# Patient Record
Sex: MALE | Race: BLACK OR AFRICAN AMERICAN | NOT HISPANIC OR LATINO
[De-identification: names, ages, dates, MRNs, and addresses within clinical notes are randomized per-mention and may not be internally consistent; named-entity substitution may affect disease eponyms.]

---

## 2022-03-24 PROBLEM — Z00.00 ENCOUNTER FOR PREVENTIVE HEALTH EXAMINATION: Status: ACTIVE | Noted: 2022-03-24

## 2022-04-26 ENCOUNTER — APPOINTMENT (OUTPATIENT)
Dept: PHYSICAL MEDICINE AND REHAB | Facility: CLINIC | Age: 22
End: 2022-04-26
Payer: MEDICAID

## 2022-04-26 VITALS
OXYGEN SATURATION: 98 % | BODY MASS INDEX: 23.1 KG/M2 | HEIGHT: 74 IN | WEIGHT: 180 LBS | HEART RATE: 77 BPM | SYSTOLIC BLOOD PRESSURE: 122 MMHG | DIASTOLIC BLOOD PRESSURE: 69 MMHG

## 2022-04-26 PROCEDURE — 99203 OFFICE O/P NEW LOW 30 MIN: CPT

## 2022-04-26 RX ORDER — DIVALPROEX SODIUM 500 1/1
500 TABLET, EXTENDED RELEASE ORAL
Refills: 0 | Status: ACTIVE | COMMUNITY
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
## 2022-04-30 NOTE — END OF VISIT
[FreeTextEntry3] : pt. seen and assessed with fellow-- agree with H&P and plan of care as above.  [] : Fellow
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
## 2022-04-30 NOTE — PHYSICAL EXAM
[FreeTextEntry1] : Constitutional: Alert, awake, no acute distress\par HEENT: EOMI, NC/AT, neck supple\par Cardiovascular: All extremities warm and well perfused\par Pulmonary: No respiratory distress, normal chest wall expansion\par Gastrointestinal: No abdominal distention\par \par Neuro:\par AAOx3, MOCA 20/30 with impairments in visuospatial/executive function, calculations, delayed recall, abstract thinking, and precise date.\par Recall 3/5 spontaneously. \par Attention--impaired \par \par CN: intact no nystagmus, visual fields intact, PERRLA, EOMI, no facial weakness or sensory deficit, shoulder shrug intact, tongue midline\par Motor 5/5 bilat UE and LE\par Sens intact bilat UE and LE\par Coordination intact--FNF and HTS intact\par Gait: Reciprocal gait without loss of balance\par

## 2022-07-19 ENCOUNTER — NON-APPOINTMENT (OUTPATIENT)
Age: 22
End: 2022-07-19

## 2022-07-19 ENCOUNTER — APPOINTMENT (OUTPATIENT)
Dept: PHYSICAL MEDICINE AND REHAB | Facility: CLINIC | Age: 22
End: 2022-07-19

## 2022-07-19 VITALS
SYSTOLIC BLOOD PRESSURE: 111 MMHG | BODY MASS INDEX: 26.95 KG/M2 | WEIGHT: 210 LBS | OXYGEN SATURATION: 98 % | HEART RATE: 68 BPM | HEIGHT: 74 IN | DIASTOLIC BLOOD PRESSURE: 70 MMHG | TEMPERATURE: 97.9 F

## 2022-07-19 PROCEDURE — 99213 OFFICE O/P EST LOW 20 MIN: CPT | Mod: GC

## 2022-07-21 NOTE — SOCIAL HISTORY
[Private Home] : in a private home [No Device Needed] : Patient doesn't use a device for ambulation [Stairs to enter?] : no stairs to enter

## 2022-07-21 NOTE — END OF VISIT
[] : Fellow [FreeTextEntry3] : Patient seen and assessed.  Agree with History, PE and plan of care as documented above.

## 2022-07-21 NOTE — PHYSICAL EXAM
[5] : S1 ankle flexors 5/5 [No Visual Abnormalities] : no visible abnormalities [Normal] : Deep tendon reflexes were 2+ and symmetric, the sensory exam was normal to light touch and pinprick and no focal deficits [FreeTextEntry1] : AAOx3\par MOCA score of 23.5/30. (last visit 20/30)\par Patient with visuospatial/executive deficits, scored 1/2 on trails B test, unable to complete clock drawing- difficulty with hand placement.\par Attention:  He is able to repeat numbers in the correct order but has difficulty with backward repetition. Also difficulty with serial 7's and scored a 2/3.\par Memory:  Delayed recall is 3/5. No issues with naming objects. \par Overall patient continues to exhibit difficulty with executive function. \par \par CN: intact no nystagmus, visual fields intact, PERRLA, EOMI, no facial weakness or sensory deficit, shoulder shrug intact, tongue midline\par  Motor 5/5 bilat UE and LE\par  Sens intact bilat UE and LE\par  Coordination intact--FNF and HTS intact \par Gait: Reciprocal gait without loss of balance
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
## 2022-10-18 ENCOUNTER — APPOINTMENT (OUTPATIENT)
Dept: PHYSICAL MEDICINE AND REHAB | Facility: CLINIC | Age: 22
End: 2022-10-18

## 2022-10-18 VITALS
SYSTOLIC BLOOD PRESSURE: 110 MMHG | DIASTOLIC BLOOD PRESSURE: 73 MMHG | TEMPERATURE: 98.1 F | OXYGEN SATURATION: 98 % | HEIGHT: 74 IN | HEART RATE: 74 BPM | BODY MASS INDEX: 26.95 KG/M2 | WEIGHT: 210 LBS

## 2022-10-18 PROCEDURE — 99213 OFFICE O/P EST LOW 20 MIN: CPT
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
## 2022-10-18 NOTE — END OF VISIT
[] : Fellow [FreeTextEntry3] : pt. seen with fellow.  agree with documentation as above.  amended where appropriate.

## 2022-10-18 NOTE — PHYSICAL EXAM
[FreeTextEntry1] : AAOx3\par MOCA  29/30. Able to say months backwards, serial 7's and more complex calculations, delayed recall intact. \par \par CN: intact no nystagmus, visual fields intact, PERRLA, EOMI, no facial weakness or sensory deficit, shoulder shrug intact, tongue midline\par Motor 5/5 bilat UE and LE\par Sens intact bilat UE and LE\par Coordination intact--FNF and HTS intact\par Gait: Reciprocal gait without loss of balance. \par \par

## 2022-10-18 NOTE — REVIEW OF SYSTEMS
[Negative] : Neurological [Fever] : no fever [Chills] : no chills [Fatigue] : no fatigue [Chest Pain] : no chest pain [Palpitations] : no palpitations [Shortness Of Breath] : no shortness of breath [Wheezing] : no wheezing [Abdominal Pain] : no abdominal pain [Nausea] : no nausea [Constipation] : no constipation [Joint Pain] : no joint pain [Joint Stiffness] : no joint stiffness [Muscle Pain] : no muscle pain [Headache] : no headaches [Dizziness] : no dizziness
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
## 2023-01-31 ENCOUNTER — NON-APPOINTMENT (OUTPATIENT)
Age: 23
End: 2023-01-31

## 2023-01-31 ENCOUNTER — APPOINTMENT (OUTPATIENT)
Dept: PHYSICAL MEDICINE AND REHAB | Facility: CLINIC | Age: 23
End: 2023-01-31
Payer: MEDICAID

## 2023-01-31 VITALS
OXYGEN SATURATION: 97 % | HEIGHT: 74 IN | TEMPERATURE: 97.3 F | BODY MASS INDEX: 27.72 KG/M2 | WEIGHT: 216 LBS | HEART RATE: 79 BPM | DIASTOLIC BLOOD PRESSURE: 70 MMHG | SYSTOLIC BLOOD PRESSURE: 105 MMHG

## 2023-01-31 DIAGNOSIS — R56.9 UNSPECIFIED CONVULSIONS: ICD-10-CM

## 2023-01-31 PROCEDURE — 99212 OFFICE O/P EST SF 10 MIN: CPT | Mod: GC
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
## 2023-03-02 NOTE — PHYSICAL EXAM
[FreeTextEntry1] : AAOx3\par Able to say months backwards, serial 7's and more complex calculations, delayed recall 2/3, 3/3 with multiple choice cues  \par \par CN: intact no nystagmus, visual fields intact, PERRLA, EOMI, no facial weakness or sensory deficit, shoulder shrug intact, tongue midline\par Motor 5/5 bilat UE and LE\par Sens intact bilat UE and LE\par Coordination intact--FNF and HTS intact\par Gait: Reciprocal gait without loss of balance

## 2023-03-02 NOTE — END OF VISIT
[] : Fellow [FreeTextEntry3] : Seen with fellow, Dr. Alma Delia Francis.  Agree with documentation as above
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
## 2023-07-19 ENCOUNTER — APPOINTMENT (OUTPATIENT)
Dept: PHYSICAL MEDICINE AND REHAB | Facility: CLINIC | Age: 23
End: 2023-07-19
Payer: MEDICAID

## 2023-07-19 VITALS
SYSTOLIC BLOOD PRESSURE: 107 MMHG | WEIGHT: 204 LBS | DIASTOLIC BLOOD PRESSURE: 65 MMHG | BODY MASS INDEX: 26.18 KG/M2 | HEIGHT: 74 IN | HEART RATE: 65 BPM | OXYGEN SATURATION: 97 % | TEMPERATURE: 97.6 F

## 2023-07-19 DIAGNOSIS — R41.89 OTHER SYMPTOMS AND SIGNS INVOLVING COGNITIVE FUNCTIONS AND AWARENESS: ICD-10-CM

## 2023-07-19 DIAGNOSIS — R41.3 OTHER AMNESIA: ICD-10-CM

## 2023-07-19 DIAGNOSIS — R41.842 VISUOSPATIAL DEFICIT: ICD-10-CM

## 2023-07-19 DIAGNOSIS — S06.9XAA UNSPECIFIED INTRACRANIAL INJURY WITH LOSS OF CONSCIOUSNESS STATUS UNKNOWN, INITIAL ENCOUNTER: ICD-10-CM

## 2023-07-19 PROCEDURE — 99214 OFFICE O/P EST MOD 30 MIN: CPT

## 2023-07-25 PROBLEM — R41.3 SHORT-TERM MEMORY LOSS: Status: ACTIVE | Noted: 2022-04-26

## 2023-07-25 PROBLEM — S06.9XAA TRAUMATIC BRAIN INJURY: Status: ACTIVE | Noted: 2022-04-26

## 2023-07-25 PROBLEM — R41.842: Status: ACTIVE | Noted: 2023-07-19

## 2023-07-25 PROBLEM — R41.89 COGNITIVE DEFICITS: Status: ACTIVE | Noted: 2022-04-26
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
## 2023-07-25 NOTE — PHYSICAL EXAM
[FreeTextEntry1] : General: no acute distress, pleasant  \par CV: well perfused \par Lungs: breathing comf on RA\par Ext: no periph edema \par Neuro:\par         Communication: +fluency, +comprehension, +repetition\par         Mental status: MOCA completed (26.5), difficulty with hand placement on clock drawing, visual spatial issues drawing cube, difficulty with serial 7s, A&Ox to person/place/ didn’t know date, recall 4/5 spontaneously & 5/5 with multiple choice cues \par         CN V-VII intact \par         Strength: 5/5 Right and Left UE/LE \par         Sensation: intact \par         Coordination: Right/left finger-to-nose nml\par         Gait: nml

## 2023-07-25 NOTE — END OF VISIT
[FreeTextEntry3] : Patient seen and examined with fellow.  Agree with documentation as above. [] : Fellow [Time Spent: ___ minutes] : I have spent [unfilled] minutes of time on the encounter.

## 2023-11-08 ENCOUNTER — APPOINTMENT (OUTPATIENT)
Dept: PHYSICAL MEDICINE AND REHAB | Facility: CLINIC | Age: 23
End: 2023-11-08

## 2023-12-27 ENCOUNTER — EMERGENCY (EMERGENCY)
Facility: HOSPITAL | Age: 23
LOS: 0 days | Discharge: HOME | End: 2023-12-28
Attending: STUDENT IN AN ORGANIZED HEALTH CARE EDUCATION/TRAINING PROGRAM
Payer: COMMERCIAL

## 2023-12-27 VITALS
WEIGHT: 160.06 LBS | RESPIRATION RATE: 18 BRPM | SYSTOLIC BLOOD PRESSURE: 140 MMHG | OXYGEN SATURATION: 100 % | DIASTOLIC BLOOD PRESSURE: 90 MMHG | TEMPERATURE: 98 F | HEIGHT: 75 IN | HEART RATE: 117 BPM

## 2023-12-27 DIAGNOSIS — U07.1 COVID-19: ICD-10-CM

## 2023-12-27 DIAGNOSIS — G40.909 EPILEPSY, UNSPECIFIED, NOT INTRACTABLE, WITHOUT STATUS EPILEPTICUS: ICD-10-CM

## 2023-12-27 DIAGNOSIS — I30.9 ACUTE PERICARDITIS, UNSPECIFIED: ICD-10-CM

## 2023-12-27 LAB
ANION GAP SERPL CALC-SCNC: 4 MMOL/L — LOW (ref 5–17)
ANION GAP SERPL CALC-SCNC: 4 MMOL/L — LOW (ref 5–17)
BASOPHILS # BLD AUTO: 0.02 K/UL — SIGNIFICANT CHANGE UP (ref 0–0.2)
BASOPHILS # BLD AUTO: 0.02 K/UL — SIGNIFICANT CHANGE UP (ref 0–0.2)
BASOPHILS NFR BLD AUTO: 0.2 % — SIGNIFICANT CHANGE UP (ref 0–2)
BASOPHILS NFR BLD AUTO: 0.2 % — SIGNIFICANT CHANGE UP (ref 0–2)
BUN SERPL-MCNC: 16 MG/DL — SIGNIFICANT CHANGE UP (ref 7–23)
BUN SERPL-MCNC: 16 MG/DL — SIGNIFICANT CHANGE UP (ref 7–23)
CALCIUM SERPL-MCNC: 9.1 MG/DL — SIGNIFICANT CHANGE UP (ref 8.5–10.1)
CALCIUM SERPL-MCNC: 9.1 MG/DL — SIGNIFICANT CHANGE UP (ref 8.5–10.1)
CHLORIDE SERPL-SCNC: 108 MMOL/L — SIGNIFICANT CHANGE UP (ref 96–108)
CHLORIDE SERPL-SCNC: 108 MMOL/L — SIGNIFICANT CHANGE UP (ref 96–108)
CK SERPL-CCNC: 125 U/L — SIGNIFICANT CHANGE UP (ref 26–308)
CK SERPL-CCNC: 125 U/L — SIGNIFICANT CHANGE UP (ref 26–308)
CO2 SERPL-SCNC: 30 MMOL/L — SIGNIFICANT CHANGE UP (ref 22–31)
CO2 SERPL-SCNC: 30 MMOL/L — SIGNIFICANT CHANGE UP (ref 22–31)
CREAT SERPL-MCNC: 0.98 MG/DL — SIGNIFICANT CHANGE UP (ref 0.5–1.3)
CREAT SERPL-MCNC: 0.98 MG/DL — SIGNIFICANT CHANGE UP (ref 0.5–1.3)
EGFR: 110 ML/MIN/1.73M2 — SIGNIFICANT CHANGE UP
EGFR: 110 ML/MIN/1.73M2 — SIGNIFICANT CHANGE UP
EOSINOPHIL # BLD AUTO: 0.04 K/UL — SIGNIFICANT CHANGE UP (ref 0–0.5)
EOSINOPHIL # BLD AUTO: 0.04 K/UL — SIGNIFICANT CHANGE UP (ref 0–0.5)
EOSINOPHIL NFR BLD AUTO: 0.4 % — SIGNIFICANT CHANGE UP (ref 0–6)
EOSINOPHIL NFR BLD AUTO: 0.4 % — SIGNIFICANT CHANGE UP (ref 0–6)
GLUCOSE SERPL-MCNC: 118 MG/DL — HIGH (ref 70–99)
GLUCOSE SERPL-MCNC: 118 MG/DL — HIGH (ref 70–99)
HCT VFR BLD CALC: 43.8 % — SIGNIFICANT CHANGE UP (ref 39–50)
HCT VFR BLD CALC: 43.8 % — SIGNIFICANT CHANGE UP (ref 39–50)
HGB BLD-MCNC: 14.7 G/DL — SIGNIFICANT CHANGE UP (ref 13–17)
HGB BLD-MCNC: 14.7 G/DL — SIGNIFICANT CHANGE UP (ref 13–17)
IMM GRANULOCYTES NFR BLD AUTO: 0.5 % — SIGNIFICANT CHANGE UP (ref 0–0.9)
IMM GRANULOCYTES NFR BLD AUTO: 0.5 % — SIGNIFICANT CHANGE UP (ref 0–0.9)
LYMPHOCYTES # BLD AUTO: 2.16 K/UL — SIGNIFICANT CHANGE UP (ref 1–3.3)
LYMPHOCYTES # BLD AUTO: 2.16 K/UL — SIGNIFICANT CHANGE UP (ref 1–3.3)
LYMPHOCYTES # BLD AUTO: 21.5 % — SIGNIFICANT CHANGE UP (ref 13–44)
LYMPHOCYTES # BLD AUTO: 21.5 % — SIGNIFICANT CHANGE UP (ref 13–44)
MCHC RBC-ENTMCNC: 30.9 PG — SIGNIFICANT CHANGE UP (ref 27–34)
MCHC RBC-ENTMCNC: 30.9 PG — SIGNIFICANT CHANGE UP (ref 27–34)
MCHC RBC-ENTMCNC: 33.6 G/DL — SIGNIFICANT CHANGE UP (ref 32–36)
MCHC RBC-ENTMCNC: 33.6 G/DL — SIGNIFICANT CHANGE UP (ref 32–36)
MCV RBC AUTO: 92 FL — SIGNIFICANT CHANGE UP (ref 80–100)
MCV RBC AUTO: 92 FL — SIGNIFICANT CHANGE UP (ref 80–100)
MONOCYTES # BLD AUTO: 0.82 K/UL — SIGNIFICANT CHANGE UP (ref 0–0.9)
MONOCYTES # BLD AUTO: 0.82 K/UL — SIGNIFICANT CHANGE UP (ref 0–0.9)
MONOCYTES NFR BLD AUTO: 8.2 % — SIGNIFICANT CHANGE UP (ref 2–14)
MONOCYTES NFR BLD AUTO: 8.2 % — SIGNIFICANT CHANGE UP (ref 2–14)
NEUTROPHILS # BLD AUTO: 6.95 K/UL — SIGNIFICANT CHANGE UP (ref 1.8–7.4)
NEUTROPHILS # BLD AUTO: 6.95 K/UL — SIGNIFICANT CHANGE UP (ref 1.8–7.4)
NEUTROPHILS NFR BLD AUTO: 69.2 % — SIGNIFICANT CHANGE UP (ref 43–77)
NEUTROPHILS NFR BLD AUTO: 69.2 % — SIGNIFICANT CHANGE UP (ref 43–77)
NRBC # BLD: 0 /100 WBCS — SIGNIFICANT CHANGE UP (ref 0–0)
NRBC # BLD: 0 /100 WBCS — SIGNIFICANT CHANGE UP (ref 0–0)
PLATELET # BLD AUTO: 206 K/UL — SIGNIFICANT CHANGE UP (ref 150–400)
PLATELET # BLD AUTO: 206 K/UL — SIGNIFICANT CHANGE UP (ref 150–400)
POTASSIUM SERPL-MCNC: 4.1 MMOL/L — SIGNIFICANT CHANGE UP (ref 3.5–5.3)
POTASSIUM SERPL-MCNC: 4.1 MMOL/L — SIGNIFICANT CHANGE UP (ref 3.5–5.3)
POTASSIUM SERPL-SCNC: 4.1 MMOL/L — SIGNIFICANT CHANGE UP (ref 3.5–5.3)
POTASSIUM SERPL-SCNC: 4.1 MMOL/L — SIGNIFICANT CHANGE UP (ref 3.5–5.3)
RBC # BLD: 4.76 M/UL — SIGNIFICANT CHANGE UP (ref 4.2–5.8)
RBC # BLD: 4.76 M/UL — SIGNIFICANT CHANGE UP (ref 4.2–5.8)
RBC # FLD: 12.7 % — SIGNIFICANT CHANGE UP (ref 10.3–14.5)
RBC # FLD: 12.7 % — SIGNIFICANT CHANGE UP (ref 10.3–14.5)
SODIUM SERPL-SCNC: 142 MMOL/L — SIGNIFICANT CHANGE UP (ref 135–145)
SODIUM SERPL-SCNC: 142 MMOL/L — SIGNIFICANT CHANGE UP (ref 135–145)
WBC # BLD: 10.04 K/UL — SIGNIFICANT CHANGE UP (ref 3.8–10.5)
WBC # BLD: 10.04 K/UL — SIGNIFICANT CHANGE UP (ref 3.8–10.5)
WBC # FLD AUTO: 10.04 K/UL — SIGNIFICANT CHANGE UP (ref 3.8–10.5)
WBC # FLD AUTO: 10.04 K/UL — SIGNIFICANT CHANGE UP (ref 3.8–10.5)

## 2023-12-27 PROCEDURE — 99285 EMERGENCY DEPT VISIT HI MDM: CPT

## 2023-12-27 RX ORDER — LEVETIRACETAM 250 MG/1
500 TABLET, FILM COATED ORAL ONCE
Refills: 0 | Status: COMPLETED | OUTPATIENT
Start: 2023-12-27 | End: 2023-12-27

## 2023-12-27 RX ORDER — VALPROIC ACID (AS SODIUM SALT) 250 MG/5ML
1000 SOLUTION, ORAL ORAL ONCE
Refills: 0 | Status: COMPLETED | OUTPATIENT
Start: 2023-12-27 | End: 2023-12-27

## 2023-12-27 RX ORDER — SODIUM CHLORIDE 9 MG/ML
1000 INJECTION INTRAMUSCULAR; INTRAVENOUS; SUBCUTANEOUS ONCE
Refills: 0 | Status: COMPLETED | OUTPATIENT
Start: 2023-12-27 | End: 2023-12-27

## 2023-12-27 NOTE — ED ADULT NURSE NOTE - NSFALLUNIVINTERV_ED_ALL_ED
Bed/Stretcher in lowest position, wheels locked, appropriate side rails in place/Call bell, personal items and telephone in reach/Instruct patient to call for assistance before getting out of bed/chair/stretcher/Non-slip footwear applied when patient is off stretcher/[place] to call system/Physically safe environment - no spills, clutter or unnecessary equipment/Purposeful proactive rounding/Room/bathroom lighting operational, light cord in reach Bed/Stretcher in lowest position, wheels locked, appropriate side rails in place/Call bell, personal items and telephone in reach/Instruct patient to call for assistance before getting out of bed/chair/stretcher/Non-slip footwear applied when patient is off stretcher/[place] to call system/Physically safe environment - no spills, clutter or unnecessary equipment/Purposeful proactive rounding/Room/bathroom lighting operational, light cord in reach
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
## 2023-12-27 NOTE — ED ADULT TRIAGE NOTE - BEFAST SCREENING
Negative (1) Drift; limb holds 90 (or 45) degrees, but drifts down before full 10 secs; does not hit bed or other support

## 2023-12-27 NOTE — ED ADULT NURSE NOTE - NS PRO PASSIVE SMOKE EXP
CV: HR initially 60-63 BPM; increased throughout shift to 80-85. Occasional PAC and PVCs. 12 lead with normal QTC at 1500. Vasopressors titrated to keep MAP> 65. Current gtts include levophed (0.65mcg/kg/min), dopamine (20mcg/kg/min), epi (1.3 mck/kg/min), vasopressin (2.4u/hr), and angiotensin II (40ng/kg/min). Heart sounds are muffled. Pulses are weak in all four extremities. Feet and hands cool and dusky. T max 99.4 axillary; MD team aware.     RESP: Ventilator changes made throughout day to manage acidosis. Most recent ABG 7.39/27/146/16. Current settings , RR 26, PEEP 5, FIO2 75%; will draw ABG at 1900 to assess changes made at approximately 1750. Lung sounds are clear with decreased bases. Minimal secretions from ETT.      NEURO: on versed and fentanyl for sedation. Period of approximately one hour with muscular vesiculation, twitching, and dilated pupils. Versed bolus given, narcan gtt stopped, and fentanyl gtt restarted. Continuous EEG in process. Currently, patient with a RASS of -3, but will rouse with painful stimulation. During these periods, patient will localize to pain and make purposeful attempts to pull at ett and lines. Followed simple commands x1. Head CT performed.    RENAL/: Oliguric. Currently net positive 7.7L. Napoles catheter in place to monitor strict I/O.     GI: Abdomen increasingly distended throughout shift. No stool or flatus. Bowel sounds hypoactive to absent. OGT found to be coiled back into esophagus; removed and readvanced. Placed to LIS. Abdominal CT performed, awaiting results.     SKIN: Multiple bruises and abrasions on lower extremities. Possible DTI on right buttock. WOC consult placed. Foam dressing to sacrum.           Problem: Mood Alteration Comorbidity  Goal: Mood Alteration Comorbidity  Description  Patient comorbidity will be monitored for signs and symptoms of Mood Alteration condition.  Problems will be absent, minimized or managed by discharge/transition of  care.  Outcome: No Change      Yes...

## 2023-12-27 NOTE — ED ADULT NURSE NOTE - CHIEF COMPLAINT QUOTE
biba s/p witnessed seizures today hx seizures takes keppra and valproic acid states compliant with rx meds was seated in car no fall /injury occurred alert and responsive at present , post ictal , denies pain or dizziness [id number] per ems biba s/p witnessed seizures today hx seizures takes keppra and valproic acid states compliant with rx meds was seated in car no fall /injury occurred alert and responsive at present , post ictal , denies pain or dizziness hj=035 per ems
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
## 2023-12-28 VITALS
DIASTOLIC BLOOD PRESSURE: 74 MMHG | OXYGEN SATURATION: 99 % | HEART RATE: 69 BPM | TEMPERATURE: 98 F | SYSTOLIC BLOOD PRESSURE: 129 MMHG | RESPIRATION RATE: 20 BRPM

## 2023-12-28 LAB
RAPID RVP RESULT: DETECTED
RAPID RVP RESULT: DETECTED
SARS-COV-2 RNA SPEC QL NAA+PROBE: DETECTED
SARS-COV-2 RNA SPEC QL NAA+PROBE: DETECTED

## 2023-12-28 PROCEDURE — 71045 X-RAY EXAM CHEST 1 VIEW: CPT | Mod: 26

## 2023-12-28 PROCEDURE — 93010 ELECTROCARDIOGRAM REPORT: CPT

## 2023-12-28 RX ORDER — IBUPROFEN 200 MG
1 TABLET ORAL
Qty: 21 | Refills: 0
Start: 2023-12-28 | End: 2024-01-03

## 2023-12-28 RX ORDER — LEVETIRACETAM 250 MG/1
1000 TABLET, FILM COATED ORAL ONCE
Refills: 0 | Status: COMPLETED | OUTPATIENT
Start: 2023-12-28 | End: 2023-12-28

## 2023-12-28 RX ADMIN — LEVETIRACETAM 400 MILLIGRAM(S): 250 TABLET, FILM COATED ORAL at 00:43

## 2023-12-28 RX ADMIN — Medication 1000 MILLIGRAM(S): at 00:18

## 2023-12-28 RX ADMIN — SODIUM CHLORIDE 1000 MILLILITER(S): 9 INJECTION INTRAMUSCULAR; INTRAVENOUS; SUBCUTANEOUS at 00:18

## 2023-12-28 RX ADMIN — LEVETIRACETAM 500 MILLIGRAM(S): 250 TABLET, FILM COATED ORAL at 00:18

## 2023-12-28 NOTE — ED PROVIDER NOTE - PROVIDER TOKENS
PROVIDER:[TOKEN:[[id number]],FOLLOWUP:[7-10 Days]] PROVIDER:[TOKEN:[[id number]],FOLLOWUP:[7-10 Days]] PROVIDER:[TOKEN:[[id number]],FOLLOWUP:[7-10 Days]],PROVIDER:[TOKEN:[[id number]],FOLLOWUP:[7-10 Days]] PROVIDER:[TOKEN:[[id number]],FOLLOWUP:[7-10 Days]],PROVIDER:[TOKEN:[[id number]],FOLLOWUP:[7-10 Days]]

## 2023-12-28 NOTE — ED PROVIDER NOTE - CARE PROVIDERS DIRECT ADDRESSES
,DirectAddress_Unknown ,DirectAddress_Unknown,[email or web address] ,DirectAddress_Unknown,[email or web address]

## 2023-12-28 NOTE — ED PROVIDER NOTE - PATIENT PORTAL LINK FT
You can access the FollowMyHealth Patient Portal offered by [facility] by registering at the following website: [email or web address]. By joining [employer]’s FollowMyHealth portal, you will also be able to view your health information using other applications (apps) compatible with our system. You can access the FollowMyHealth Patient Portal offered by [facility] by registering at the following website: [email or web address]. By joining [employer]’s FollowMyHealth portal, you will also be able to view your health information using other applications (apps) compatible with our system.

## 2023-12-28 NOTE — ED PROVIDER NOTE - CLINICAL SUMMARY MEDICAL DECISION MAKING FREE TEXT BOX
24 y/o M hx of seizures on valproic acid 1000mg BID and keppra 500mg BID presents for witnessed seizure. lasted 1 minute in nature, convulsions. last seizure several months ago, appears to be his baseline seizure. did ont take his home dose at night. endorsing cold for the past few symptoms, flu like symptoms. denies chest pain/sob. denies headache. denies visual changes. denies abdominal pain.   benign exam, well appearing. no signs of tongue lacerations/ urinary incontinence.   spoke to dr. gamboa from neuro, will have patient f/u with their neurologist. will give 1g keppra IVP and give home medication night time dose.  will check lytes, fluid bolus. EKG. 22 y/o M hx of seizures on valproic acid 1000mg BID and keppra 500mg BID presents for witnessed seizure. lasted 1 minute in nature, convulsions. last seizure several months ago, appears to be his baseline seizure. did ont take his home dose at night. endorsing cold for the past few symptoms, flu like symptoms. denies chest pain/sob. denies headache. denies visual changes. denies abdominal pain.   benign exam, well appearing. no signs of tongue lacerations/ urinary incontinence.   spoke to dr. gamboa from neuro, will have patient f/u with their neurologist. will give 1g keppra IVP and give home medication night time dose.  will check lytes, fluid bolus. EKG. 24 y/o M hx of seizures on valproic acid 1000mg BID and keppra 500mg BID presents for witnessed seizure. lasted 1 minute in nature, convulsions. last seizure several months ago, appears to be his baseline seizure. did ont take his home dose at night. endorsing cold for the past few symptoms, flu like symptoms. denies chest pain/sob. denies headache. denies visual changes. denies abdominal pain.   benign exam, well appearing. no signs of tongue lacerations/ urinary incontinence.   spoke to dr. gamboa from neuro, will have patient f/u with their neurologist. will give 1g keppra IVP and give home medication night time dose.  will check lytes, fluid bolus. EKG.    EKG results discussed w/ patient and mother at bedside - no active chest pain/sob but showing pericarditis, diffuse EFREN, no reciprocal depressions. ibuprofen sent to their pharmacy. 22 y/o M hx of seizures on valproic acid 1000mg BID and keppra 500mg BID presents for witnessed seizure. lasted 1 minute in nature, convulsions. last seizure several months ago, appears to be his baseline seizure. did ont take his home dose at night. endorsing cold for the past few symptoms, flu like symptoms. denies chest pain/sob. denies headache. denies visual changes. denies abdominal pain.   benign exam, well appearing. no signs of tongue lacerations/ urinary incontinence.   spoke to dr. gamboa from neuro, will have patient f/u with their neurologist. will give 1g keppra IVP and give home medication night time dose.  will check lytes, fluid bolus. EKG.    EKG results discussed w/ patient and mother at bedside - no active chest pain/sob but showing pericarditis, diffuse EFREN, no reciprocal depressions. ibuprofen sent to their pharmacy.

## 2023-12-28 NOTE — ED PROVIDER NOTE - PHYSICAL EXAMINATION
General: Well appearing male in no acute distress  HEENT: Normocephalic, atraumatic. Moist mucous membranes. Oropharynx clear. No lymphadenopathy.  Eyes: No scleral icterus. EOMI. ALLY.  Neck:. Soft and supple. Full ROM without pain. No midline tenderness  Cardiac: Regular rate and regular rhythm. No murmurs, rubs, gallops. Peripheral pulses 2+ and symmetric. No LE edema.  Resp: Lungs CTAB. Speaking in full sentences. No wheezes, rales or rhonchi.  Abd: Soft, non-tender, non-distended. No guarding or rebound. No scars, masses, or lesions.  Back: Spine midline and non-tender. No CVA tenderness.    Skin: No rashes, abrasions, or lacerations.  Neuro: AO x 3. Moves all extremities symmetrically. Motor strength and sensation grossly intact.

## 2023-12-28 NOTE — ED PROVIDER NOTE - OBJECTIVE STATEMENT
24 y/o M hx of seizures on valproic acid 1000mg BID and keppra 500mg BID presents for witnessed seizure. lasted 1 minute in nature, convulsions. last seizure several months ago, appears to be his baseline seizure. did ont take his home dose at night. endorsing cold for the past few symptoms, flu like symptoms. denies chest pain/sob. denies headache. denies visual changes. denies abdominal pain. 22 y/o M hx of seizures on valproic acid 1000mg BID and keppra 500mg BID presents for witnessed seizure. lasted 1 minute in nature, convulsions. last seizure several months ago, appears to be his baseline seizure. did ont take his home dose at night. endorsing cold for the past few symptoms, flu like symptoms. denies chest pain/sob. denies headache. denies visual changes. denies abdominal pain.

## 2023-12-28 NOTE — ED PROVIDER NOTE - PROGRESS NOTE DETAILS
pt stable, wants to go home pt signed out to me from dr cantu, pt presented s/p witnessed seizure, pt is on keppra at home, missed his dose today, stable in the ER, no seizure activity, neuro consulted by dr cantu, pt loaded with keppra 1gram and given his nighttime dose, has follow up tmr with his neurologist, will reassess

## 2023-12-28 NOTE — ED PROVIDER NOTE - NSFOLLOWUPINSTRUCTIONS_ED_ALL_ED_FT
followup with your neurologist in next 1-7 days.   Continue your home medication for seizures.     Seizure    A seizure is abnormal electrical activity in the brain; the specific cause may or may not be found. Prior to a seizure you may experience a warning sensation (aura) that may include fear, nausea, dizziness, and visual changes such as flashing lights of spots. Common symptoms during the seizure may include an altered mental status, rhythmic jerking movements, drooling, grunting, loss of bladder or bowel control, or tongue biting. After a seizure, you may feel confused and sleepy.     Do not swim, drive, operate machinery, or engage in any risky activity during which a seizure could cause further injury to you or others. Teach friends and family what to do if you HAVE a seizure which includes laying you on the ground with your head on a cushion and turning you to the side to keep your breathing passages clear in case of vomiting.    SEEK IMMEDIATE MEDICAL CARE IF YOU HAVE ANY OF THE FOLLOWING SYMPTOMS: seizure lasting over 5 minutes, not waking up or persistent altered mental status after the seizure, or more frequent or worsening seizures. followup with your cardiologist/ neurologist in next 1-7 days.   Continue your home medication for seizures.   Take ibuprofen every 8 hours for next 7 days for pericarditis.     Seizure    A seizure is abnormal electrical activity in the brain; the specific cause may or may not be found. Prior to a seizure you may experience a warning sensation (aura) that may include fear, nausea, dizziness, and visual changes such as flashing lights of spots. Common symptoms during the seizure may include an altered mental status, rhythmic jerking movements, drooling, grunting, loss of bladder or bowel control, or tongue biting. After a seizure, you may feel confused and sleepy.     Do not swim, drive, operate machinery, or engage in any risky activity during which a seizure could cause further injury to you or others. Teach friends and family what to do if you HAVE a seizure which includes laying you on the ground with your head on a cushion and turning you to the side to keep your breathing passages clear in case of vomiting.    SEEK IMMEDIATE MEDICAL CARE IF YOU HAVE ANY OF THE FOLLOWING SYMPTOMS: seizure lasting over 5 minutes, not waking up or persistent altered mental status after the seizure, or more frequent or worsening seizures.

## 2023-12-28 NOTE — ED PROVIDER NOTE - CARE PROVIDER_API CALL
Cl Neumann)  Neurology  [address] [facility] [address]  [place] [address]  Phone: [phone]  Fax: [phone]  Follow Up Time: 7-10 Days   Cl Neumann)  Neurology  [address] [facility] [address]  [place] [address]  Phone: [phone]  Fax: [phone]  Follow Up Time: 7-10 Days   Cl Neumann)  Neurology  [address] [facility] [address]  [place] [address]  Phone: [phone]  Fax: [phone]  Follow Up Time: 7-10 Days    Rogelio Swan  Cardiovascular Disease  [address] [place] [address]  Phone: [phone]  Fax: [phone]  Follow Up Time: 7-10 Days   Cl Neumann)  Neurology  [address] [facility] [address]  [place] [address]  Phone: [phone]  Fax: [phone]  Follow Up Time: 7-10 Days    Rogelio Swan  Cardiovascular Disease  [address] [place] [address]  Phone: [phone]  Fax: [phone]  Follow Up Time: 7-10 Days

## 2024-01-08 ENCOUNTER — NON-APPOINTMENT (OUTPATIENT)
Age: 24
End: 2024-01-08

## 2024-02-13 ENCOUNTER — INPATIENT (INPATIENT)
Facility: HOSPITAL | Age: 24
LOS: 1 days | Discharge: HOME | DRG: 101 | End: 2024-02-15
Attending: PSYCHIATRY & NEUROLOGY | Admitting: PSYCHIATRY & NEUROLOGY
Payer: MEDICAID

## 2024-02-13 ENCOUNTER — EMERGENCY (EMERGENCY)
Facility: HOSPITAL | Age: 24
LOS: 1 days | Discharge: ANOTHER HOSPITAL | End: 2024-02-13
Attending: EMERGENCY MEDICINE | Admitting: EMERGENCY MEDICINE
Payer: MEDICAID

## 2024-02-13 VITALS
OXYGEN SATURATION: 98 % | HEART RATE: 72 BPM | RESPIRATION RATE: 18 BRPM | TEMPERATURE: 97 F | SYSTOLIC BLOOD PRESSURE: 98 MMHG | DIASTOLIC BLOOD PRESSURE: 55 MMHG

## 2024-02-13 VITALS
DIASTOLIC BLOOD PRESSURE: 58 MMHG | HEART RATE: 112 BPM | TEMPERATURE: 99 F | SYSTOLIC BLOOD PRESSURE: 104 MMHG | OXYGEN SATURATION: 98 % | RESPIRATION RATE: 16 BRPM

## 2024-02-13 VITALS
RESPIRATION RATE: 16 BRPM | HEART RATE: 87 BPM | OXYGEN SATURATION: 98 % | SYSTOLIC BLOOD PRESSURE: 103 MMHG | DIASTOLIC BLOOD PRESSURE: 60 MMHG

## 2024-02-13 DIAGNOSIS — R56.9 UNSPECIFIED CONVULSIONS: ICD-10-CM

## 2024-02-13 LAB
ALBUMIN SERPL ELPH-MCNC: 3.6 G/DL — SIGNIFICANT CHANGE UP (ref 3.3–5)
ALP SERPL-CCNC: 63 U/L — SIGNIFICANT CHANGE UP (ref 40–120)
ALT FLD-CCNC: 39 U/L — SIGNIFICANT CHANGE UP (ref 12–78)
ANION GAP SERPL CALC-SCNC: 3 MMOL/L — LOW (ref 5–17)
APPEARANCE UR: CLEAR — SIGNIFICANT CHANGE UP
APTT BLD: 27 SEC — SIGNIFICANT CHANGE UP (ref 24.5–35.6)
AST SERPL-CCNC: 23 U/L — SIGNIFICANT CHANGE UP (ref 15–37)
BASOPHILS # BLD AUTO: 0.01 K/UL — SIGNIFICANT CHANGE UP (ref 0–0.2)
BASOPHILS NFR BLD AUTO: 0.2 % — SIGNIFICANT CHANGE UP (ref 0–2)
BILIRUB SERPL-MCNC: 0.9 MG/DL — SIGNIFICANT CHANGE UP (ref 0.2–1.2)
BILIRUB UR-MCNC: NEGATIVE — SIGNIFICANT CHANGE UP
BUN SERPL-MCNC: 14 MG/DL — SIGNIFICANT CHANGE UP (ref 7–23)
CALCIUM SERPL-MCNC: 9.6 MG/DL — SIGNIFICANT CHANGE UP (ref 8.5–10.1)
CHLORIDE SERPL-SCNC: 111 MMOL/L — HIGH (ref 96–108)
CO2 SERPL-SCNC: 29 MMOL/L — SIGNIFICANT CHANGE UP (ref 22–31)
COLOR SPEC: YELLOW — SIGNIFICANT CHANGE UP
CREAT SERPL-MCNC: 1.1 MG/DL — SIGNIFICANT CHANGE UP (ref 0.5–1.3)
DIFF PNL FLD: NEGATIVE — SIGNIFICANT CHANGE UP
EGFR: 96 ML/MIN/1.73M2 — SIGNIFICANT CHANGE UP
EOSINOPHIL # BLD AUTO: 0.06 K/UL — SIGNIFICANT CHANGE UP (ref 0–0.5)
EOSINOPHIL NFR BLD AUTO: 1 % — SIGNIFICANT CHANGE UP (ref 0–6)
GLUCOSE SERPL-MCNC: 92 MG/DL — SIGNIFICANT CHANGE UP (ref 70–99)
GLUCOSE UR QL: NEGATIVE MG/DL — SIGNIFICANT CHANGE UP
HCT VFR BLD CALC: 47.3 % — SIGNIFICANT CHANGE UP (ref 39–50)
HGB BLD-MCNC: 15.8 G/DL — SIGNIFICANT CHANGE UP (ref 13–17)
IMM GRANULOCYTES NFR BLD AUTO: 0.3 % — SIGNIFICANT CHANGE UP (ref 0–0.9)
INR BLD: 0.96 RATIO — SIGNIFICANT CHANGE UP (ref 0.85–1.18)
KETONES UR-MCNC: ABNORMAL MG/DL
LACTATE SERPL-SCNC: 2.4 MMOL/L — HIGH (ref 0.7–2)
LEUKOCYTE ESTERASE UR-ACNC: NEGATIVE — SIGNIFICANT CHANGE UP
LIDOCAIN IGE QN: 20 U/L — SIGNIFICANT CHANGE UP (ref 13–75)
LYMPHOCYTES # BLD AUTO: 2.52 K/UL — SIGNIFICANT CHANGE UP (ref 1–3.3)
LYMPHOCYTES # BLD AUTO: 41 % — SIGNIFICANT CHANGE UP (ref 13–44)
MCHC RBC-ENTMCNC: 30.9 PG — SIGNIFICANT CHANGE UP (ref 27–34)
MCHC RBC-ENTMCNC: 33.4 GM/DL — SIGNIFICANT CHANGE UP (ref 32–36)
MCV RBC AUTO: 92.4 FL — SIGNIFICANT CHANGE UP (ref 80–100)
MONOCYTES # BLD AUTO: 0.59 K/UL — SIGNIFICANT CHANGE UP (ref 0–0.9)
MONOCYTES NFR BLD AUTO: 9.6 % — SIGNIFICANT CHANGE UP (ref 2–14)
NEUTROPHILS # BLD AUTO: 2.94 K/UL — SIGNIFICANT CHANGE UP (ref 1.8–7.4)
NEUTROPHILS NFR BLD AUTO: 47.9 % — SIGNIFICANT CHANGE UP (ref 43–77)
NITRITE UR-MCNC: NEGATIVE — SIGNIFICANT CHANGE UP
NRBC # BLD: 0 /100 WBCS — SIGNIFICANT CHANGE UP (ref 0–0)
PCP SPEC-MCNC: SIGNIFICANT CHANGE UP
PH UR: 7 — SIGNIFICANT CHANGE UP (ref 5–8)
PLATELET # BLD AUTO: 187 K/UL — SIGNIFICANT CHANGE UP (ref 150–400)
POTASSIUM SERPL-MCNC: 4.1 MMOL/L — SIGNIFICANT CHANGE UP (ref 3.5–5.3)
POTASSIUM SERPL-SCNC: 4.1 MMOL/L — SIGNIFICANT CHANGE UP (ref 3.5–5.3)
PROT SERPL-MCNC: 7.4 G/DL — SIGNIFICANT CHANGE UP (ref 6–8.3)
PROT UR-MCNC: NEGATIVE MG/DL — SIGNIFICANT CHANGE UP
PROTHROM AB SERPL-ACNC: 11.2 SEC — SIGNIFICANT CHANGE UP (ref 9.5–13)
RBC # BLD: 5.12 M/UL — SIGNIFICANT CHANGE UP (ref 4.2–5.8)
RBC # FLD: 12.6 % — SIGNIFICANT CHANGE UP (ref 10.3–14.5)
SODIUM SERPL-SCNC: 143 MMOL/L — SIGNIFICANT CHANGE UP (ref 135–145)
SP GR SPEC: 1.02 — SIGNIFICANT CHANGE UP (ref 1–1.03)
TROPONIN I, HIGH SENSITIVITY RESULT: 8.5 NG/L — SIGNIFICANT CHANGE UP
UROBILINOGEN FLD QL: 1 MG/DL — SIGNIFICANT CHANGE UP (ref 0.2–1)
VALPROATE SERPL-MCNC: 94 UG/ML — SIGNIFICANT CHANGE UP (ref 50–100)
WBC # BLD: 6.14 K/UL — SIGNIFICANT CHANGE UP (ref 3.8–10.5)
WBC # FLD AUTO: 6.14 K/UL — SIGNIFICANT CHANGE UP (ref 3.8–10.5)

## 2024-02-13 PROCEDURE — 70450 CT HEAD/BRAIN W/O DYE: CPT | Mod: 26,MA

## 2024-02-13 PROCEDURE — 84484 ASSAY OF TROPONIN QUANT: CPT

## 2024-02-13 PROCEDURE — 71045 X-RAY EXAM CHEST 1 VIEW: CPT

## 2024-02-13 PROCEDURE — 96374 THER/PROPH/DIAG INJ IV PUSH: CPT

## 2024-02-13 PROCEDURE — 93010 ELECTROCARDIOGRAM REPORT: CPT

## 2024-02-13 PROCEDURE — 70450 CT HEAD/BRAIN W/O DYE: CPT | Mod: MA

## 2024-02-13 PROCEDURE — 36415 COLL VENOUS BLD VENIPUNCTURE: CPT

## 2024-02-13 PROCEDURE — 95813 EEG EXTND MNTR 61-119 MIN: CPT | Mod: 26

## 2024-02-13 PROCEDURE — 99285 EMERGENCY DEPT VISIT HI MDM: CPT

## 2024-02-13 PROCEDURE — 72125 CT NECK SPINE W/O DYE: CPT | Mod: MA

## 2024-02-13 PROCEDURE — 72170 X-RAY EXAM OF PELVIS: CPT | Mod: 26

## 2024-02-13 PROCEDURE — 99285 EMERGENCY DEPT VISIT HI MDM: CPT | Mod: 25

## 2024-02-13 PROCEDURE — 71045 X-RAY EXAM CHEST 1 VIEW: CPT | Mod: 26

## 2024-02-13 PROCEDURE — 81003 URINALYSIS AUTO W/O SCOPE: CPT

## 2024-02-13 PROCEDURE — 93010 ELECTROCARDIOGRAM REPORT: CPT | Mod: 76,77

## 2024-02-13 PROCEDURE — 72125 CT NECK SPINE W/O DYE: CPT | Mod: 26,MA

## 2024-02-13 PROCEDURE — 83605 ASSAY OF LACTIC ACID: CPT

## 2024-02-13 PROCEDURE — 85730 THROMBOPLASTIN TIME PARTIAL: CPT

## 2024-02-13 PROCEDURE — 85025 COMPLETE CBC W/AUTO DIFF WBC: CPT

## 2024-02-13 PROCEDURE — 85610 PROTHROMBIN TIME: CPT

## 2024-02-13 PROCEDURE — 83690 ASSAY OF LIPASE: CPT

## 2024-02-13 PROCEDURE — 72170 X-RAY EXAM OF PELVIS: CPT

## 2024-02-13 PROCEDURE — 80053 COMPREHEN METABOLIC PANEL: CPT

## 2024-02-13 PROCEDURE — 80177 DRUG SCRN QUAN LEVETIRACETAM: CPT

## 2024-02-13 PROCEDURE — 80307 DRUG TEST PRSMV CHEM ANLYZR: CPT

## 2024-02-13 PROCEDURE — 96361 HYDRATE IV INFUSION ADD-ON: CPT

## 2024-02-13 PROCEDURE — 93005 ELECTROCARDIOGRAM TRACING: CPT

## 2024-02-13 PROCEDURE — 80164 ASSAY DIPROPYLACETIC ACD TOT: CPT

## 2024-02-13 RX ORDER — LEVETIRACETAM 250 MG/1
500 TABLET, FILM COATED ORAL
Refills: 0 | Status: DISCONTINUED | OUTPATIENT
Start: 2024-02-14 | End: 2024-02-15

## 2024-02-13 RX ORDER — LEVETIRACETAM 250 MG/1
500 TABLET, FILM COATED ORAL ONCE
Refills: 0 | Status: COMPLETED | OUTPATIENT
Start: 2024-02-13 | End: 2024-02-13

## 2024-02-13 RX ORDER — DILTIAZEM HCL 120 MG
10 CAPSULE, EXT RELEASE 24 HR ORAL ONCE
Refills: 0 | Status: COMPLETED | OUTPATIENT
Start: 2024-02-13 | End: 2024-02-13

## 2024-02-13 RX ORDER — VALPROIC ACID (AS SODIUM SALT) 250 MG/5ML
750 SOLUTION, ORAL ORAL ONCE
Refills: 0 | Status: COMPLETED | OUTPATIENT
Start: 2024-02-13 | End: 2024-02-13

## 2024-02-13 RX ORDER — DIAZEPAM 5 MG
10 TABLET ORAL ONCE
Refills: 0 | Status: DISCONTINUED | OUTPATIENT
Start: 2024-02-13 | End: 2024-02-15

## 2024-02-13 RX ORDER — VALPROIC ACID (AS SODIUM SALT) 250 MG/5ML
750 SOLUTION, ORAL ORAL
Refills: 0 | Status: DISCONTINUED | OUTPATIENT
Start: 2024-02-14 | End: 2024-02-15

## 2024-02-13 RX ORDER — DILTIAZEM HCL 120 MG
60 CAPSULE, EXT RELEASE 24 HR ORAL ONCE
Refills: 0 | Status: COMPLETED | OUTPATIENT
Start: 2024-02-13 | End: 2024-02-13

## 2024-02-13 RX ORDER — SODIUM CHLORIDE 9 MG/ML
250 INJECTION INTRAMUSCULAR; INTRAVENOUS; SUBCUTANEOUS ONCE
Refills: 0 | Status: COMPLETED | OUTPATIENT
Start: 2024-02-13 | End: 2024-02-13

## 2024-02-13 RX ORDER — LACOSAMIDE 50 MG/1
200 TABLET ORAL ONCE
Refills: 0 | Status: DISCONTINUED | OUTPATIENT
Start: 2024-02-13 | End: 2024-02-15

## 2024-02-13 RX ADMIN — Medication 10 MILLIGRAM(S): at 17:20

## 2024-02-13 RX ADMIN — Medication 60 MILLIGRAM(S): at 17:26

## 2024-02-13 RX ADMIN — SODIUM CHLORIDE 250 MILLILITER(S): 9 INJECTION INTRAMUSCULAR; INTRAVENOUS; SUBCUTANEOUS at 17:01

## 2024-02-13 RX ADMIN — SODIUM CHLORIDE 250 MILLILITER(S): 9 INJECTION INTRAMUSCULAR; INTRAVENOUS; SUBCUTANEOUS at 20:08

## 2024-02-13 RX ADMIN — Medication 750 MILLIGRAM(S): at 22:57

## 2024-02-13 RX ADMIN — LEVETIRACETAM 500 MILLIGRAM(S): 250 TABLET, FILM COATED ORAL at 22:57
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
## 2024-02-13 NOTE — PATIENT PROFILE ADULT - FALL HARM RISK - RISK INTERVENTIONS

## 2024-02-13 NOTE — H&P ADULT - NSHPPHYSICALEXAM_GEN_ALL_CORE
Neurologic Exam:  Mental status - Awake, Alert, Oriented to person, place, and time. Speech fluent, repetition and naming intact. Follows simple and complex commands. Attention/concentration, recent and remote memory (including registration and recall), and fund of knowledge intact    Cranial nerves - PERRLA, VFF, EOMI, face sensation (V1-V3) intact b/l, facial strength intact without asymmetry b/l, hearing intact b/l, palate with symmetric elevation, trapezius OR sternocleidomastiod 5/5 strength b/l, tongue midline on protrusion with full lateral movement    Motor - Normal bulk and tone throughout. No pronator drift.  Strength testing            Deltoid      Biceps      Triceps     Wrist Extension    Wrist Flexion     Interossei         R            5                 5               5                     5                              5                        5                 5  L             5                 5               5                     5                              5                        5                 5              Hip Flexion    Hip Extension    Knee Flexion    Knee Extension    Dorsiflexion    Plantar Flexion  R              5                           5                       5                           5                            5                          5  L              5                           5                        5                           5                            5                          5    Sensation - Light touch/temperature OR pain/vibration intact throughout    DTR's -             Biceps      Triceps     Brachioradialis      Patellar    Ankle    Toes/plantar response  R             2+             2+                  2+                       2+            2+                 Down  L              2+             2+                 2+                        2+           2+                 Down    Coordination - Finger to Nose intact b/l. No tremors appreciated    Gait and station - Normal casual gait. Romberg (-) Neurologic Exam:  Mental status - Awake, Alert, Oriented to person, place, and time (knows current date). Speech fluent, repetition and naming intact. Follows simple and complex commands. Attention/concentration, memory and fund of knowledge grossly intact    Cranial nerves - PERRLA, VFF, EOMI, face sensation (V1-V3) intact b/l, facial strength intact without asymmetry b/l, hearing intact b/l, palate with symmetric elevation, trapezius 5/5 strength b/l, tongue midline on protrusion with full lateral movement    Motor - Normal bulk and tone throughout. No pronator drift.  Strength testing            Deltoid      Biceps      Triceps     Wrist Extension    Wrist Flexion     Interossei         R            5                 5               5                     5                              5                        5                 5  L             5                 5               5                     5                              5                        5                 5              Hip Flexion    Hip Extension    Knee Flexion    Knee Extension    Dorsiflexion    Plantar Flexion  R              5                           5                       5                           5                            5                          5  L              5                           5                        5                           5                            5                          5    Sensation - Light touch intact throughout    DTR's -             Biceps      Triceps     Brachioradialis      Patellar    Ankle    Toes/plantar response  R             2+             2+                  2+                       2+            2+                 Down  L              2+             2+                 2+                        2+           2+                 Down    Coordination - Finger to Nose intact b/l. No tremors appreciated    Gait and station - Not assessed
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
## 2024-02-13 NOTE — ED ADULT NURSE NOTE - NSFALLRISKINTERV_ED_ALL_ED
Assistance OOB with selected safe patient handling equipment if applicable/Communicate fall risk and risk factors to all staff, patient, and family/Orthostatic vital signs/Provide visual cue: yellow wristband, yellow gown, etc/Reinforce activity limits and safety measures with patient and family/Call bell, personal items and telephone in reach/Instruct patient to call for assistance before getting out of bed/chair/stretcher/Non-slip footwear applied when patient is off stretcher/[place] to call system/Physically safe environment - no spills, clutter or unnecessary equipment/Purposeful Proactive Rounding/Room/bathroom lighting operational, light cord in reach

## 2024-02-13 NOTE — H&P ADULT - NSHPREVIEWOFSYSTEMS_GEN_ALL_CORE
CONSTITUTIONAL: No fevers or chills  EYES AND ENT: No visual changes or no throat pain   NECK: No pain or stiffness  RESPIRATORY: No hemoptysis or shortness of breath  CARDIOVASCULAR: No chest pain or palpitations  GASTROINTESTINAL: No melena or hematochezia  GENITOURINARY: No dysuria or hematuria  NEUROLOGICAL: +As stated in HPI above  SKIN: No itching, burning, rashes, or lesions   All other review of systems is negative unless indicated above.

## 2024-02-13 NOTE — ED PROVIDER NOTE - PHYSICAL EXAMINATION
GENERAL: well appearing in no acute distress  HEAD: pt with b/l swelling of temporal region from previous brain injury  HEENT: normal conjunctiva, oral mucosa moist,   CARDIAC: irregular rate and rhythm, no appreciable murmurs, 2+ pulses in UE/LE b/l  PULM:  normal breath sounds, clear to ascultation bilaterally, no rales, rhonchi, wheezing  GI: abdomen nondistended, soft, nontender, no guarding, rebound tenderness  BACK: nontender, no midline spinal tenderness, pelvis stable  NEURO: AAOx3, no focal neurologic deficits  MSK: moving all ext, 5/5 b/l  upper/lower ext, sensation intact, no calf tenderness b/l  SKIN: well-perfused, extremities warm, no visible rashes  PSYCH: appropriate mood and affect

## 2024-02-13 NOTE — H&P ADULT - NSHPLABSRESULTS_GEN_ALL_CORE
RADIOLOGY, EKG & ADDITIONAL TESTS: Reviewed. Labs:   Pending    CT Head:  No CT evidence of acute intracranial hemorrhage.  Bilateral frontal craniectomies with bilateral encephalomalacia and   gliosis in the anterior frontal lobes.    CT CERVICAL SPINE:  On the sagittal reformations, there is no prevertebral soft tissue swelling. There is no splaying of the spinous processes. On the coronal reformations, occipital condyles are normal. Lateral masses of C1 align normally with C2.  On the axial images, no lucent fracture line is identified. Normal vertebral body height. Straightening of the normal lordotic curvature.  IMPRESSION:  No acute fracture.
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
## 2024-02-13 NOTE — ED PROVIDER NOTE - PROGRESS NOTE DETAILS
JUSTIN: Discussed repeat EKG with Dr. Marques, old EKG obtained from pt, will send to cardiology here Case discussed with  with new EKG, no concern for STEMI  Case discussed with hospitalist and Dr. Haq recommending transfer for 24 hour EEG monitoring
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
## 2024-02-13 NOTE — ED PROVIDER NOTE - NOTES
Case d/w Dr. Marques Will consult pt admit to Hospitalist, Pt's PMD does not  come here (Dr. Branham)
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
## 2024-02-14 ENCOUNTER — RESULT REVIEW (OUTPATIENT)
Age: 24
End: 2024-02-14

## 2024-02-14 DIAGNOSIS — Z86.79 PERSONAL HISTORY OF OTHER DISEASES OF THE CIRCULATORY SYSTEM: ICD-10-CM

## 2024-02-14 DIAGNOSIS — I48.91 UNSPECIFIED ATRIAL FIBRILLATION: ICD-10-CM

## 2024-02-14 DIAGNOSIS — I31.9 DISEASE OF PERICARDIUM, UNSPECIFIED: ICD-10-CM

## 2024-02-14 LAB
ALBUMIN SERPL ELPH-MCNC: 4.1 G/DL — SIGNIFICANT CHANGE UP (ref 3.3–5)
ALP SERPL-CCNC: 58 U/L — SIGNIFICANT CHANGE UP (ref 40–120)
ALT FLD-CCNC: 28 U/L — SIGNIFICANT CHANGE UP (ref 10–45)
ANION GAP SERPL CALC-SCNC: 12 MMOL/L — SIGNIFICANT CHANGE UP (ref 5–17)
AST SERPL-CCNC: 22 U/L — SIGNIFICANT CHANGE UP (ref 10–40)
BILIRUB SERPL-MCNC: 1.2 MG/DL — SIGNIFICANT CHANGE UP (ref 0.2–1.2)
BUN SERPL-MCNC: 16 MG/DL — SIGNIFICANT CHANGE UP (ref 7–23)
CALCIUM SERPL-MCNC: 9.4 MG/DL — SIGNIFICANT CHANGE UP (ref 8.4–10.5)
CHLORIDE SERPL-SCNC: 105 MMOL/L — SIGNIFICANT CHANGE UP (ref 96–108)
CK SERPL-CCNC: 280 U/L — HIGH (ref 30–200)
CO2 SERPL-SCNC: 25 MMOL/L — SIGNIFICANT CHANGE UP (ref 22–31)
CREAT SERPL-MCNC: 1.13 MG/DL — SIGNIFICANT CHANGE UP (ref 0.5–1.3)
CRP SERPL-MCNC: <3 MG/L — SIGNIFICANT CHANGE UP (ref 0–4)
EGFR: 93 ML/MIN/1.73M2 — SIGNIFICANT CHANGE UP
ERYTHROCYTE [SEDIMENTATION RATE] IN BLOOD: 2 MM/HR — SIGNIFICANT CHANGE UP (ref 0–15)
ETHANOL SERPL-MCNC: <10 MG/DL — SIGNIFICANT CHANGE UP (ref 0–10)
GLUCOSE SERPL-MCNC: 76 MG/DL — SIGNIFICANT CHANGE UP (ref 70–99)
HCT VFR BLD CALC: 48.7 % — SIGNIFICANT CHANGE UP (ref 39–50)
HGB BLD-MCNC: 16.7 G/DL — SIGNIFICANT CHANGE UP (ref 13–17)
LACTATE SERPL-SCNC: 1.2 MMOL/L — SIGNIFICANT CHANGE UP (ref 0.5–2)
MAGNESIUM SERPL-MCNC: 2.1 MG/DL — SIGNIFICANT CHANGE UP (ref 1.6–2.6)
MCHC RBC-ENTMCNC: 31.2 PG — SIGNIFICANT CHANGE UP (ref 27–34)
MCHC RBC-ENTMCNC: 34.3 GM/DL — SIGNIFICANT CHANGE UP (ref 32–36)
MCV RBC AUTO: 91 FL — SIGNIFICANT CHANGE UP (ref 80–100)
NRBC # BLD: 0 /100 WBCS — SIGNIFICANT CHANGE UP (ref 0–0)
PHOSPHATE SERPL-MCNC: 4.4 MG/DL — SIGNIFICANT CHANGE UP (ref 2.5–4.5)
PLATELET # BLD AUTO: 179 K/UL — SIGNIFICANT CHANGE UP (ref 150–400)
POTASSIUM SERPL-MCNC: 3.7 MMOL/L — SIGNIFICANT CHANGE UP (ref 3.5–5.3)
POTASSIUM SERPL-SCNC: 3.7 MMOL/L — SIGNIFICANT CHANGE UP (ref 3.5–5.3)
PROT SERPL-MCNC: 6.9 G/DL — SIGNIFICANT CHANGE UP (ref 6–8.3)
RBC # BLD: 5.35 M/UL — SIGNIFICANT CHANGE UP (ref 4.2–5.8)
RBC # FLD: 12.7 % — SIGNIFICANT CHANGE UP (ref 10.3–14.5)
SODIUM SERPL-SCNC: 142 MMOL/L — SIGNIFICANT CHANGE UP (ref 135–145)
TSH SERPL-MCNC: 3.81 UIU/ML — SIGNIFICANT CHANGE UP (ref 0.27–4.2)
VALPROATE SERPL-MCNC: 82 UG/ML — SIGNIFICANT CHANGE UP (ref 50–100)
WBC # BLD: 8.32 K/UL — SIGNIFICANT CHANGE UP (ref 3.8–10.5)
WBC # FLD AUTO: 8.32 K/UL — SIGNIFICANT CHANGE UP (ref 3.8–10.5)

## 2024-02-14 PROCEDURE — 93356 MYOCRD STRAIN IMG SPCKL TRCK: CPT

## 2024-02-14 PROCEDURE — 99223 1ST HOSP IP/OBS HIGH 75: CPT | Mod: GC

## 2024-02-14 PROCEDURE — 95720 EEG PHY/QHP EA INCR W/VEEG: CPT

## 2024-02-14 PROCEDURE — 99222 1ST HOSP IP/OBS MODERATE 55: CPT

## 2024-02-14 PROCEDURE — 93306 TTE W/DOPPLER COMPLETE: CPT | Mod: 26

## 2024-02-14 RX ORDER — METOPROLOL TARTRATE 50 MG
25 TABLET ORAL
Refills: 0 | Status: DISCONTINUED | OUTPATIENT
Start: 2024-02-14 | End: 2024-02-15

## 2024-02-14 RX ORDER — ENOXAPARIN SODIUM 100 MG/ML
40 INJECTION SUBCUTANEOUS EVERY 24 HOURS
Refills: 0 | Status: DISCONTINUED | OUTPATIENT
Start: 2024-02-14 | End: 2024-02-15

## 2024-02-14 RX ADMIN — LEVETIRACETAM 500 MILLIGRAM(S): 250 TABLET, FILM COATED ORAL at 21:18

## 2024-02-14 RX ADMIN — LEVETIRACETAM 500 MILLIGRAM(S): 250 TABLET, FILM COATED ORAL at 05:17

## 2024-02-14 RX ADMIN — Medication 25 MILLIGRAM(S): at 05:17

## 2024-02-14 RX ADMIN — Medication 750 MILLIGRAM(S): at 05:17

## 2024-02-14 RX ADMIN — Medication 750 MILLIGRAM(S): at 21:18
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
## 2024-02-14 NOTE — CHART NOTE - NSCHARTNOTEFT_GEN_A_CORE
Possible medication change from Divalproex to Vimpat discussed with patient and mother  Proposed medication change is to start lacosamide 100 mg IV x 1, then start 200 mg PO q12, change LEV to ER, and to discontinue divalproex  Primary team has reached out to patient's outpatient neurologist Dr. Chirag Abarca at [phone] office regarding medication, pending call back  Patient and his family would lie primary team to discuss with Dr. Abarca prior to changing any seizure medication     - For now, plan to hold on medication change pending discussion with Dr. Abarca Possible medication change from Divalproex to Vimpat discussed with patient and mother.  Proposed medication change is to start lacosamide 100 mg IV x 1, then start 200 mg PO q12, change LEV to ER, and to discontinue divalproex.  Primary team has reached out to patient's outpatient neurologist Dr. Chirag Abarca at [phone] office regarding medication, pending call back.  Patient and his family would like primary team to discuss with Dr. Abarca prior to changing any seizure medication.    - For now, plan to hold on medication change pending discussion with Dr. Abarca

## 2024-02-14 NOTE — EEG REPORT - NS EEG TEXT BOX
Day 1 – 	  Start: 2/13/2024  9:58:37 PM  End: 2/14/2024  08:00  Duration: 9 hr  46 min    Daily EEG Visual Analysis    FINDINGS:  The background was continuous, symmetric, spontaneously variable and reactive. During wakefulness, the posterior dominant rhythm consisted of symmetric, 9 Hz activity, with amplitude to 30 uV, that attenuated to eye opening.  Low amplitude frontal beta was noted in wakefulness. Anterior to posterior gradient was present.     Background Slowing:  No generalized background slowing was present.    Focal Slowing:   None were present.    Sleep Background:  Drowsiness was characterized by fragmentation, attenuation, and slowing of the background activity.    Sleep was characterized by the presence of vertex waves, symmetric sleep spindles and K-complexes.    Other Non-Epileptiform Findings:  None    Activation Procedures:   Hyperventilation was not performed.    Photic stimulation was not performed.    Interictal Epileptiform Activity:   Rare sharp and slow wave seen maximally over the left frontal region with a field to the left anterior mesial temporal and right frontal region.     Events:  No events or seizures recorded.    Artifacts:  Intermittent myogenic and movement artifacts were noted.    ECG:  The heart rate on single channel ECG was predominantly between 60-70 BPM.    ASMs:   Keppra 500 bid  Valproic acid 750 mg BID    EEG Summary:    Abnormal EEG in the awake, drowsy and asleep states.    1.	Rare sharp and slow wave seen maximally over the left frontal region (F4) with a field to the left anterio-mesial temporal region.       Impression/Clinical Correlate:    This is an abnormal EEG record.     1.	Increased risk of focal onset seizures from the left fronto-temporal region.       Gerald Roach MD  Fellow, [facility] Epilepsy [place]   Day 1 – 	  Start: 2/13/2024  9:58:37 PM  End: 2/14/2024  08:00  Duration: 9 hr  46 min    Daily EEG Visual Analysis    FINDINGS:  The background was continuous, symmetric, spontaneously variable and reactive. During wakefulness, the posterior dominant rhythm consisted of symmetric, 9 Hz activity, with amplitude to 30 uV, that attenuated to eye opening.  Low amplitude frontal beta was noted in wakefulness. Anterior to posterior gradient was present.     Background Slowing:  No generalized background slowing was present.    Focal Slowing:   None were present.    Sleep Background:  Drowsiness was characterized by fragmentation, attenuation, and slowing of the background activity.    Sleep was characterized by the presence of vertex waves, symmetric sleep spindles and K-complexes.    Other Non-Epileptiform Findings:  None    Activation Procedures:   Hyperventilation was not performed.    Photic stimulation was not performed.    Interictal Epileptiform Activity:   Rare sharp and slow wave seen maximally over the left frontal region with a field to the left anterior mesial temporal and right frontal regions.     Events:  No events or seizures recorded.    Artifacts:  Intermittent myogenic and movement artifacts were noted.    ECG:  The heart rate on single channel ECG was predominantly between 60-70 BPM.    ASMs:   Keppra 500 bid  Valproic acid 750 mg BID    EEG Summary:    Abnormal EEG in the awake, drowsy and asleep states.    1.	Rare sharp and slow wave seen maximally over the left frontal region           Impression/Clinical Correlate:    This is an abnormal EEG record.     1.	Increased risk of focal onset seizures from the left frontal region.       Gerald Roach MD  Fellow, [facility] Epilepsy [place]
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
## 2024-02-15 ENCOUNTER — TRANSCRIPTION ENCOUNTER (OUTPATIENT)
Age: 24
End: 2024-02-15

## 2024-02-15 VITALS
HEART RATE: 71 BPM | DIASTOLIC BLOOD PRESSURE: 72 MMHG | OXYGEN SATURATION: 97 % | TEMPERATURE: 99 F | SYSTOLIC BLOOD PRESSURE: 126 MMHG | RESPIRATION RATE: 18 BRPM

## 2024-02-15 LAB
ANION GAP SERPL CALC-SCNC: 10 MMOL/L — SIGNIFICANT CHANGE UP (ref 5–17)
BUN SERPL-MCNC: 22 MG/DL — SIGNIFICANT CHANGE UP (ref 7–23)
CALCIUM SERPL-MCNC: 9.4 MG/DL — SIGNIFICANT CHANGE UP (ref 8.4–10.5)
CHLORIDE SERPL-SCNC: 105 MMOL/L — SIGNIFICANT CHANGE UP (ref 96–108)
CK SERPL-CCNC: 205 U/L — HIGH (ref 30–200)
CO2 SERPL-SCNC: 27 MMOL/L — SIGNIFICANT CHANGE UP (ref 22–31)
CREAT SERPL-MCNC: 1.12 MG/DL — SIGNIFICANT CHANGE UP (ref 0.5–1.3)
EGFR: 94 ML/MIN/1.73M2 — SIGNIFICANT CHANGE UP
GLUCOSE SERPL-MCNC: 78 MG/DL — SIGNIFICANT CHANGE UP (ref 70–99)
HCT VFR BLD CALC: 47.1 % — SIGNIFICANT CHANGE UP (ref 39–50)
HGB BLD-MCNC: 15.9 G/DL — SIGNIFICANT CHANGE UP (ref 13–17)
LACTATE SERPL-SCNC: 1 MMOL/L — SIGNIFICANT CHANGE UP (ref 0.5–2)
LEVETIRACETAM SERPL-MCNC: 11.5 UG/ML — SIGNIFICANT CHANGE UP (ref 10–40)
LEVETIRACETAM SERPL-MCNC: 12 UG/ML — SIGNIFICANT CHANGE UP (ref 10–40)
MCHC RBC-ENTMCNC: 30.8 PG — SIGNIFICANT CHANGE UP (ref 27–34)
MCHC RBC-ENTMCNC: 33.8 GM/DL — SIGNIFICANT CHANGE UP (ref 32–36)
MCV RBC AUTO: 91.1 FL — SIGNIFICANT CHANGE UP (ref 80–100)
NRBC # BLD: 0 /100 WBCS — SIGNIFICANT CHANGE UP (ref 0–0)
PLATELET # BLD AUTO: 153 K/UL — SIGNIFICANT CHANGE UP (ref 150–400)
POTASSIUM SERPL-MCNC: 4 MMOL/L — SIGNIFICANT CHANGE UP (ref 3.5–5.3)
POTASSIUM SERPL-SCNC: 4 MMOL/L — SIGNIFICANT CHANGE UP (ref 3.5–5.3)
RBC # BLD: 5.17 M/UL — SIGNIFICANT CHANGE UP (ref 4.2–5.8)
RBC # FLD: 12.4 % — SIGNIFICANT CHANGE UP (ref 10.3–14.5)
SODIUM SERPL-SCNC: 142 MMOL/L — SIGNIFICANT CHANGE UP (ref 135–145)
WBC # BLD: 7.31 K/UL — SIGNIFICANT CHANGE UP (ref 3.8–10.5)
WBC # FLD AUTO: 7.31 K/UL — SIGNIFICANT CHANGE UP (ref 3.8–10.5)

## 2024-02-15 PROCEDURE — 80053 COMPREHEN METABOLIC PANEL: CPT

## 2024-02-15 PROCEDURE — 95718 EEG PHYS/QHP 2-12 HR W/VEEG: CPT

## 2024-02-15 PROCEDURE — 80177 DRUG SCRN QUAN LEVETIRACETAM: CPT

## 2024-02-15 PROCEDURE — 80307 DRUG TEST PRSMV CHEM ANLYZR: CPT

## 2024-02-15 PROCEDURE — 84443 ASSAY THYROID STIM HORMONE: CPT

## 2024-02-15 PROCEDURE — 99233 SBSQ HOSP IP/OBS HIGH 50: CPT

## 2024-02-15 PROCEDURE — 93005 ELECTROCARDIOGRAM TRACING: CPT

## 2024-02-15 PROCEDURE — 95713 VEEG 2-12 HR CONT MNTR: CPT

## 2024-02-15 PROCEDURE — 95813 EEG EXTND MNTR 61-119 MIN: CPT

## 2024-02-15 PROCEDURE — 80164 ASSAY DIPROPYLACETIC ACD TOT: CPT

## 2024-02-15 PROCEDURE — 85652 RBC SED RATE AUTOMATED: CPT

## 2024-02-15 PROCEDURE — 83605 ASSAY OF LACTIC ACID: CPT

## 2024-02-15 PROCEDURE — 99238 HOSP IP/OBS DSCHRG MGMT 30/<: CPT | Mod: GC

## 2024-02-15 PROCEDURE — 93356 MYOCRD STRAIN IMG SPCKL TRCK: CPT

## 2024-02-15 PROCEDURE — 95700 EEG CONT REC W/VID EEG TECH: CPT

## 2024-02-15 PROCEDURE — 85027 COMPLETE CBC AUTOMATED: CPT

## 2024-02-15 PROCEDURE — 82550 ASSAY OF CK (CPK): CPT

## 2024-02-15 PROCEDURE — 95716 VEEG EA 12-26HR CONT MNTR: CPT

## 2024-02-15 PROCEDURE — 84100 ASSAY OF PHOSPHORUS: CPT

## 2024-02-15 PROCEDURE — 93306 TTE W/DOPPLER COMPLETE: CPT

## 2024-02-15 PROCEDURE — 83735 ASSAY OF MAGNESIUM: CPT

## 2024-02-15 PROCEDURE — 80048 BASIC METABOLIC PNL TOTAL CA: CPT

## 2024-02-15 PROCEDURE — 86140 C-REACTIVE PROTEIN: CPT

## 2024-02-15 PROCEDURE — C9254: CPT

## 2024-02-15 RX ORDER — LEVETIRACETAM 250 MG/1
1 TABLET, FILM COATED ORAL
Qty: 60 | Refills: 3
Start: 2024-02-15

## 2024-02-15 RX ORDER — LACOSAMIDE 50 MG/1
200 TABLET ORAL
Refills: 0 | Status: DISCONTINUED | OUTPATIENT
Start: 2024-02-15 | End: 2024-02-15

## 2024-02-15 RX ORDER — LEVETIRACETAM 250 MG/1
1 TABLET, FILM COATED ORAL
Qty: 60 | Refills: 0
Start: 2024-02-15 | End: 2024-03-15

## 2024-02-15 RX ORDER — LACOSAMIDE 50 MG/1
1 TABLET ORAL
Qty: 60 | Refills: 3
Start: 2024-02-15

## 2024-02-15 RX ORDER — LEVETIRACETAM 250 MG/1
1 TABLET, FILM COATED ORAL
Refills: 0 | DISCHARGE

## 2024-02-15 RX ORDER — METOPROLOL TARTRATE 50 MG
1 TABLET ORAL
Qty: 60 | Refills: 3
Start: 2024-02-15

## 2024-02-15 RX ORDER — LACOSAMIDE 50 MG/1
200 TABLET ORAL ONCE
Refills: 0 | Status: DISCONTINUED | OUTPATIENT
Start: 2024-02-15 | End: 2024-02-15

## 2024-02-15 RX ORDER — VALPROIC ACID (AS SODIUM SALT) 250 MG/5ML
3 SOLUTION, ORAL ORAL
Refills: 0 | DISCHARGE

## 2024-02-15 RX ORDER — METOPROLOL TARTRATE 50 MG
1 TABLET ORAL
Qty: 60 | Refills: 0
Start: 2024-02-15 | End: 2024-03-15

## 2024-02-15 RX ORDER — LACOSAMIDE 50 MG/1
1 TABLET ORAL
Qty: 60 | Refills: 0
Start: 2024-02-15 | End: 2024-03-15

## 2024-02-15 RX ADMIN — LEVETIRACETAM 500 MILLIGRAM(S): 250 TABLET, FILM COATED ORAL at 08:44

## 2024-02-15 RX ADMIN — LACOSAMIDE 200 MILLIGRAM(S): 50 TABLET ORAL at 08:45
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
## 2024-02-15 NOTE — DISCHARGE NOTE PROVIDER - NSDCCPCAREPLAN_GEN_ALL_CORE_FT
PRINCIPAL DISCHARGE DIAGNOSIS  Diagnosis: Seizures  Assessment and Plan of Treatment: You were admitted to the EMU for seizures .  You were started on lacosamide and continued on your home keppra dose. Your home medication of depakote was discontinued. Your home neurologist was contacted and agreed with the medication adjustment. You will continue taking these medications on discharge. Please make an appointment to follow up with Dr. Abarca in 2-4 weeks. If you would like to see Dr. Madden as outpatient you may do so as well.  Follow up with your primary care doctor in 1-2 weeks. If you have recurrent or frequent seizures please notify your neurologist and call 911.   Seizure Safety Instructions  1. [facility] law mandates you to self-report your seizure disorder to [facility] and be seizure-free for 1yr before you can drive.   2. Avoid swimming, diving, taking a bath, cooking, use of motorized machineries unsupervised.   3. Avoid climbing a ladder, trees or any height.  4. Use machines with safety switches.  5. Always be aware of your surroundings and make sure family and friends are aware of your seizures.  6. Use non-breakable dishes.  7. Consider wearing a medical bracelet to inform people you have epilepsy in case of an emergency.  To get better and follow your care plan as instructed.      SECONDARY DISCHARGE DIAGNOSES  Diagnosis: Afib  Assessment and Plan of Treatment: In the emergency room at [facility] you were found to be in AFIB and given Cardizem. You are currently on metoprolol for heart rate control. You are no longer in AFIB. Please follow up with outpatient Cardiology in 2-4 weeks for further management. ,

## 2024-02-15 NOTE — PROVIDER CONTACT NOTE (OTHER) - ACTION/TREATMENT ORDERED:
Provider came to speak with RN and Patient. they are discussing potential safe discharge options. patients no longer wants to stay in the hospital. MD will speak with attending for d/c plan.

## 2024-02-15 NOTE — DISCHARGE NOTE PROVIDER - CARE PROVIDER_API CALL
Denis Madden.  Neurology  [address], [address]  [place] [address]  Phone: [phone]  Fax: [phone]  Follow Up Time: 2 weeks    Cardiology,   Outpatient Cardiology appointments, call  [phone] to arrange with an appointment  Phone: (   )    -  Fax: (   )    -  Follow Up Time:

## 2024-02-15 NOTE — PROVIDER CONTACT NOTE (OTHER) - SITUATION
Patient appears restless and agitated, pulled off EEG leads and removed tele wires in spots. Endorsing frustration. This behavior is different than the last two days of treatment

## 2024-02-15 NOTE — DISCHARGE NOTE PROVIDER - CARE PROVIDERS DIRECT ADDRESSES
[email or web address]_Unknown
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
## 2024-02-15 NOTE — DISCHARGE NOTE PROVIDER - NSDCMRMEDTOKEN_GEN_ALL_CORE_FT
lacosamide 200 mg oral tablet: 1 tab(s) orally 2 times a day MDD: 400mg  levETIRAcetam 500 mg oral tablet: 1 tab(s) orally 2 times a day MDD: 1000mg  Metoprolol Tartrate 25 mg oral tablet: 1 tab(s) orally 2 times a day MDD: 50mg

## 2024-02-15 NOTE — EEG REPORT - NS EEG TEXT BOX
Day 2 – 	  Start: 2/14/2024 08:00  End: 2/15/2024  08:00  Duration: 24h    Daily EEG Visual Analysis    FINDINGS:  The background was continuous, symmetric, spontaneously variable and reactive. During wakefulness, the posterior dominant rhythm consisted of symmetric, 9 Hz activity, with amplitude to 30 uV, that attenuated to eye opening.  Low amplitude frontal beta was noted in wakefulness. Anterior to posterior gradient was present.     Background Slowing:  No generalized background slowing was present.    Focal Slowing:   None were present.    Sleep Background:  Drowsiness was characterized by fragmentation, attenuation, and slowing of the background activity.    Sleep was characterized by the presence of vertex waves, symmetric sleep spindles and K-complexes.    Other Non-Epileptiform Findings:  None    Activation Procedures:   Hyperventilation was not performed.    Photic stimulation was not performed.    Interictal Epileptiform Activity:   Rare sharp and slow wave seen maximally over the left frontal region with a field to the left anterior mesial temporal and right frontal region.     Events:  No events or seizures recorded.    Artifacts:  Intermittent myogenic and movement artifacts were noted.    ECG:  The heart rate on single channel ECG was predominantly between 60-70 BPM.    ASMs:   Keppra 500 bid  Valproic acid 750 mg BID       EEG Summary:    Abnormal EEG in the awake, drowsy and asleep states.  ?	Rare sharp and slow waves seen maximally over the left frontal region with a field to the left anterio-mesial temporal region.       Impression/Clinical Correlate:  This is an abnormal EEG record.   ?	Increased risk of focal onset seizures from the left frontal region.         Gerald Roach MD  Fellow, [facility] [place]       Chad Morales M.D.  Attending Physician, [facility] [place]     Day 2 – 	  Start: 2/14/2024 08:00  End: 2/15/2024  17:00  Duration: 33 h    Daily EEG Visual Analysis    FINDINGS:  The background was continuous, symmetric, spontaneously variable and reactive. During wakefulness, the posterior dominant rhythm consisted of symmetric, 9 Hz activity, with amplitude to 30 uV, that attenuated to eye opening.  Low amplitude frontal beta was noted in wakefulness. Anterior to posterior gradient was present.     Background Slowing:  No generalized background slowing was present.    Focal Slowing:   None were present.    Sleep Background:  Drowsiness was characterized by fragmentation, attenuation, and slowing of the background activity.    Sleep was characterized by the presence of vertex waves, symmetric sleep spindles and K-complexes.    Other Non-Epileptiform Findings:  None    Activation Procedures:   Hyperventilation was not performed.    Photic stimulation was not performed.    Interictal Epileptiform Activity:   Rare sharp and slow wave seen maximally over the left frontal region with a field to the left anterior mesial temporal and right frontal region.     Events:  No events or seizures recorded.    Artifacts:  Intermittent myogenic and movement artifacts were noted.    ECG:  The heart rate on single channel ECG was predominantly between 60-70 BPM.    ASMs:   Keppra 500 bid  Valproic acid 750 mg BID       EEG Summary:    Abnormal EEG in the awake, drowsy and asleep states.  ?	Rare sharp and slow waves seen maximally over the left frontal region with a field to the left anterio-mesial temporal region.       Impression/Clinical Correlate:  This is an abnormal EEG record.   ?	Increased risk of focal onset seizures from the left frontal region.         Gerald Roach MD  Fellow, [facility] [place]       Chad Morales M.D.  Attending Physician, [facility] [place]

## 2024-02-15 NOTE — DISCHARGE NOTE NURSING/CASE MANAGEMENT/SOCIAL WORK - PATIENT PORTAL LINK FT
You can access the FollowMyHealth Patient Portal offered by [facility] by registering at the following website: [email or web address]. By joining [facility]’s FollowMyHealth portal, you will also be able to view your health information using other applications (apps) compatible with our system.

## 2024-02-15 NOTE — DISCHARGE NOTE PROVIDER - PROVIDER TOKENS
PROVIDER:[TOKEN:[[id number]],FOLLOWUP:[2 weeks]],FREE:[LAST:[Cardiology],PHONE:[(   )    -],FAX:[(   )    -],ADDRESS:[Outpatient Cardiology appointments, call  [phone] to arrange with an appointment]]

## 2024-11-19 NOTE — ED PROVIDER NOTE - NS ED ROS FT
LVM for pt to return my call.    General: Denies fever, chills  HEENT: Denies sensory changes, sore throat  Neck: Denies neck pain, neck stiffness  Resp: Denies coughing, SOB  Cardiovascular: Denies CP, palpitations, LE edema  GI: Denies nausea, vomiting, abdominal pain, diarrhea, constipation, blood in stool  : Denies dysuria, hematuria, frequency, incontinence  MSK: Denies back pain  Neuro: Denies HA, dizziness, numbness, weakness  Skin: Denies rashes.